# Patient Record
Sex: FEMALE | Race: BLACK OR AFRICAN AMERICAN | NOT HISPANIC OR LATINO | URBAN - METROPOLITAN AREA
[De-identification: names, ages, dates, MRNs, and addresses within clinical notes are randomized per-mention and may not be internally consistent; named-entity substitution may affect disease eponyms.]

---

## 2018-10-31 ENCOUNTER — EMERGENCY (EMERGENCY)
Facility: HOSPITAL | Age: 28
LOS: 1 days | Discharge: HOME | End: 2018-10-31
Admitting: NEUROLOGICAL SURGERY
Payer: MEDICAID

## 2018-10-31 VITALS
DIASTOLIC BLOOD PRESSURE: 73 MMHG | RESPIRATION RATE: 16 BRPM | HEART RATE: 96 BPM | TEMPERATURE: 99 F | OXYGEN SATURATION: 100 % | SYSTOLIC BLOOD PRESSURE: 138 MMHG

## 2018-10-31 PROCEDURE — 99284 EMERGENCY DEPT VISIT MOD MDM: CPT

## 2018-10-31 RX ORDER — ACETAMINOPHEN 500 MG
975 TABLET ORAL ONCE
Qty: 0 | Refills: 0 | Status: COMPLETED | OUTPATIENT
Start: 2018-10-31 | End: 2018-10-31

## 2018-10-31 RX ORDER — SODIUM CHLORIDE 9 MG/ML
1000 INJECTION INTRAMUSCULAR; INTRAVENOUS; SUBCUTANEOUS ONCE
Qty: 0 | Refills: 0 | Status: COMPLETED | OUTPATIENT
Start: 2018-10-31 | End: 2018-10-31

## 2018-10-31 RX ORDER — METOCLOPRAMIDE HCL 10 MG
10 TABLET ORAL ONCE
Qty: 0 | Refills: 0 | Status: COMPLETED | OUTPATIENT
Start: 2018-10-31 | End: 2018-10-31

## 2018-10-31 RX ADMIN — Medication 10 MILLIGRAM(S): at 23:37

## 2018-10-31 RX ADMIN — Medication 975 MILLIGRAM(S): at 23:36

## 2018-10-31 RX ADMIN — SODIUM CHLORIDE 1000 MILLILITER(S): 9 INJECTION INTRAMUSCULAR; INTRAVENOUS; SUBCUTANEOUS at 23:37

## 2018-10-31 NOTE — ED PROVIDER NOTE - MEDICAL DECISION MAKING DETAILS
29 yo F, active smoker with no significant PMH of  who presents to the ER c/o persistent headache s/p head injury for duration of 3 days w/ swelling. Well appearing female, s/p head injury of unknown etiology, p/w acute persistent headache, no LOC, not on blood thinner, no n/v, no blurry vision, no dizziness, no focal neuro deficits, ambulate with steady gait. GCS of 15. no signs of ICH on exam. likely head contusion, concussion. No warrant for imaging at this time. Discussed risks and benefits of CT head. Pt does not want to wait for CT, agrees to plan.

## 2018-10-31 NOTE — ED PROVIDER NOTE - PROGRESS NOTE DETAILS
PAULETTE HICKEY: Patient reassessed, sitting comfortably in chair in NAD, denies any complaints. States feeling better, symptoms improved. Pending lab results. Pt will be signed out to PAULETTE Dietz to f/u with lab results, if negative, pt can be discharged to f/u with PCP. Return precaution given to patient. Pt verbalizes understanding. PAULETTE Dietz: Received signout from PAULETTE Dooley, pt feeling better, states she no longer has HA. No Nuchal rigidity, pt well appearing, slight wbc count likely from pain, plan is likely D/C with PMD and neurology F/U suspect cause to be a concussion. PAULETTE Dietz: Received signout from PAULETTE Dooley, pt feeling better, states she no longer has HA. No Nuchal rigidity, pt well appearing, slight wbc count likely from pain, plan is likely D/C with PMD and neurology F/U suspect cause to be a concussion. Pt was offered a CT scan by PAULETTE Dooley but declined, pt states she would like to leave as soon as possible.

## 2018-10-31 NOTE — ED ADULT TRIAGE NOTE - CHIEF COMPLAINT QUOTE
Pt. w/ no pmh c/o left sided head pain. Pt. states on Monday while play fighting hurt the top left side of her head with an unknown object. pt. denies any loss of consciousness , Pt. denies any blurry vision , any N/V. Pt. c/o pain 7/10 , appears comfortable in triage. Pt. has a small bump on top left side of the head. Pt. states pressure increases when she bends down to tie her shoes.

## 2018-10-31 NOTE — ED PROVIDER NOTE - CARE PLAN
Principal Discharge DX:	Injury of head, initial encounter  Secondary Diagnosis:	Contusion of head, unspecified part of head, initial encounter  Secondary Diagnosis:	Headache Principal Discharge DX:	Injury of head, initial encounter  Assessment and plan of treatment:	Medication for your headaches have been sent to your pharmacy. Follow up with your primary doctor and a neurologist, referral is included in your discharge packet. Advance activity as tolerated.  Continue all previously prescribed medications as directed.  Follow up with your primary care physician in 48-72 hours- bring copies of your results.  Return to the ER for worsening or persistent symptoms, and/or ANY NEW OR CONCERNING SYMPTOMS. If you have issues obtaining follow up, please call: [phone] to obtain a doctor or specialist who takes your insurance in your area.  You may call [phone] to make an appointment with the internal medicine clinic.  Secondary Diagnosis:	Contusion of head, unspecified part of head, initial encounter  Secondary Diagnosis:	Headache

## 2018-10-31 NOTE — ED PROVIDER NOTE - OBJECTIVE STATEMENT
29 yo F, active smoker with no significant PMH of  who presents to the ER c/o persistent headache s/p head injury for duration of 3 days w/ swelling. Pt states onset is sudden after head injury of unknown etiology, no actual witness of what happened, her friends happened to turn away, states she's standing near staircase and side of the house. Pt states she was outside the house, play fighting with her friends, and suddenly hit her head somehow, felt dizzy, like she's about to pass out, but no LOC. Reports pain at left frontal area, character of pain is pressure, aching, constant,  9/10 in severity, non-radiating. Admits taking Tylenol and ice compress without improvement. Also, reports she feeling more tired after the head injury. Denies any fever, chills, n/v/d/c, dizziness, blurry vision, weakness, numbness, hearing loss, nasal drainage, neck pain, chest pain, sob, abdominal pain, dysuria, leg swelling, recent travel or any other complaints.

## 2018-11-01 LAB
ALBUMIN SERPL ELPH-MCNC: 4.1 G/DL — SIGNIFICANT CHANGE UP (ref 3.3–5)
ALP SERPL-CCNC: 64 U/L — SIGNIFICANT CHANGE UP (ref 40–120)
ALT FLD-CCNC: 17 U/L — SIGNIFICANT CHANGE UP (ref 4–33)
AST SERPL-CCNC: 19 U/L — SIGNIFICANT CHANGE UP (ref 4–32)
BASOPHILS # BLD AUTO: 0.03 K/UL — SIGNIFICANT CHANGE UP (ref 0–0.2)
BASOPHILS NFR BLD AUTO: 0.2 % — SIGNIFICANT CHANGE UP (ref 0–2)
BILIRUB SERPL-MCNC: < 0.2 MG/DL — LOW (ref 0.2–1.2)
BUN SERPL-MCNC: 12 MG/DL — SIGNIFICANT CHANGE UP (ref 7–23)
CALCIUM SERPL-MCNC: 9.2 MG/DL — SIGNIFICANT CHANGE UP (ref 8.4–10.5)
CHLORIDE SERPL-SCNC: 104 MMOL/L — SIGNIFICANT CHANGE UP (ref 98–107)
CO2 SERPL-SCNC: 25 MMOL/L — SIGNIFICANT CHANGE UP (ref 22–31)
CREAT SERPL-MCNC: 0.8 MG/DL — SIGNIFICANT CHANGE UP (ref 0.5–1.3)
EOSINOPHIL # BLD AUTO: 0.08 K/UL — SIGNIFICANT CHANGE UP (ref 0–0.5)
EOSINOPHIL NFR BLD AUTO: 0.6 % — SIGNIFICANT CHANGE UP (ref 0–6)
GLUCOSE SERPL-MCNC: 94 MG/DL — SIGNIFICANT CHANGE UP (ref 70–99)
HCT VFR BLD CALC: 40.5 % — SIGNIFICANT CHANGE UP (ref 34.5–45)
HGB BLD-MCNC: 12.6 G/DL — SIGNIFICANT CHANGE UP (ref 11.5–15.5)
IMM GRANULOCYTES # BLD AUTO: 0.03 # — SIGNIFICANT CHANGE UP
IMM GRANULOCYTES NFR BLD AUTO: 0.2 % — SIGNIFICANT CHANGE UP (ref 0–1.5)
LYMPHOCYTES # BLD AUTO: 25.2 % — SIGNIFICANT CHANGE UP (ref 13–44)
LYMPHOCYTES # BLD AUTO: 3.11 K/UL — SIGNIFICANT CHANGE UP (ref 1–3.3)
MCHC RBC-ENTMCNC: 27.8 PG — SIGNIFICANT CHANGE UP (ref 27–34)
MCHC RBC-ENTMCNC: 31.1 % — LOW (ref 32–36)
MCV RBC AUTO: 89.4 FL — SIGNIFICANT CHANGE UP (ref 80–100)
MONOCYTES # BLD AUTO: 0.8 K/UL — SIGNIFICANT CHANGE UP (ref 0–0.9)
MONOCYTES NFR BLD AUTO: 6.5 % — SIGNIFICANT CHANGE UP (ref 2–14)
NEUTROPHILS # BLD AUTO: 8.31 K/UL — HIGH (ref 1.8–7.4)
NEUTROPHILS NFR BLD AUTO: 67.3 % — SIGNIFICANT CHANGE UP (ref 43–77)
NRBC # FLD: 0 — SIGNIFICANT CHANGE UP
PLATELET # BLD AUTO: 218 K/UL — SIGNIFICANT CHANGE UP (ref 150–400)
PMV BLD: 10.2 FL — SIGNIFICANT CHANGE UP (ref 7–13)
POTASSIUM SERPL-MCNC: 4.4 MMOL/L — SIGNIFICANT CHANGE UP (ref 3.5–5.3)
POTASSIUM SERPL-SCNC: 4.4 MMOL/L — SIGNIFICANT CHANGE UP (ref 3.5–5.3)
PROT SERPL-MCNC: 7.3 G/DL — SIGNIFICANT CHANGE UP (ref 6–8.3)
RBC # BLD: 4.53 M/UL — SIGNIFICANT CHANGE UP (ref 3.8–5.2)
RBC # FLD: 12.8 % — SIGNIFICANT CHANGE UP (ref 10.3–14.5)
SODIUM SERPL-SCNC: 140 MMOL/L — SIGNIFICANT CHANGE UP (ref 135–145)
WBC # BLD: 12.36 K/UL — HIGH (ref 3.8–10.5)
WBC # FLD AUTO: 12.36 K/UL — HIGH (ref 3.8–10.5)

## 2018-11-01 RX ORDER — METOCLOPRAMIDE HCL 10 MG
1 TABLET ORAL
Qty: 20 | Refills: 0 | OUTPATIENT
Start: 2018-11-01 | End: 2018-11-05

## 2021-07-15 PROBLEM — Z00.00 ENCOUNTER FOR PREVENTIVE HEALTH EXAMINATION: Status: ACTIVE | Noted: 2021-07-15

## 2021-07-19 ENCOUNTER — APPOINTMENT (OUTPATIENT)
Dept: ORTHOPEDIC SURGERY | Facility: CLINIC | Age: 31
End: 2021-07-19
Payer: OTHER MISCELLANEOUS

## 2021-07-19 VITALS — HEART RATE: 72 BPM | DIASTOLIC BLOOD PRESSURE: 71 MMHG | SYSTOLIC BLOOD PRESSURE: 116 MMHG

## 2021-07-19 DIAGNOSIS — F17.210 NICOTINE DEPENDENCE, CIGARETTES, UNCOMPLICATED: ICD-10-CM

## 2021-07-19 PROCEDURE — 73110 X-RAY EXAM OF WRIST: CPT | Mod: LT

## 2021-07-19 PROCEDURE — 99072 ADDL SUPL MATRL&STAF TM PHE: CPT

## 2021-07-19 PROCEDURE — 99204 OFFICE O/P NEW MOD 45 MIN: CPT

## 2021-07-19 RX ORDER — NAPROXEN 500 MG/1
500 TABLET ORAL
Qty: 14 | Refills: 0 | Status: ACTIVE | COMMUNITY
Start: 2021-06-17

## 2021-07-20 PROBLEM — F17.210 CIGARETTE SMOKER: Status: ACTIVE | Noted: 2021-07-20

## 2021-08-25 ENCOUNTER — APPOINTMENT (OUTPATIENT)
Dept: ORTHOPEDIC SURGERY | Facility: CLINIC | Age: 31
End: 2021-08-25
Payer: OTHER MISCELLANEOUS

## 2021-08-25 PROCEDURE — 99072 ADDL SUPL MATRL&STAF TM PHE: CPT

## 2021-08-25 PROCEDURE — 99214 OFFICE O/P EST MOD 30 MIN: CPT

## 2021-10-25 ENCOUNTER — APPOINTMENT (OUTPATIENT)
Dept: ORTHOPEDIC SURGERY | Facility: CLINIC | Age: 31
End: 2021-10-25
Payer: OTHER MISCELLANEOUS

## 2021-10-25 VITALS
HEIGHT: 67 IN | BODY MASS INDEX: 22.91 KG/M2 | HEART RATE: 80 BPM | DIASTOLIC BLOOD PRESSURE: 75 MMHG | SYSTOLIC BLOOD PRESSURE: 109 MMHG | WEIGHT: 146 LBS

## 2021-10-25 PROCEDURE — 99214 OFFICE O/P EST MOD 30 MIN: CPT

## 2021-10-25 PROCEDURE — 99072 ADDL SUPL MATRL&STAF TM PHE: CPT

## 2021-10-25 RX ORDER — CLINDAMYCIN PHOSPHATE 20 MG/G
2 CREAM VAGINAL
Qty: 40 | Refills: 0 | Status: DISCONTINUED | COMMUNITY
Start: 2021-05-06 | End: 2021-10-25

## 2021-10-25 RX ORDER — METRONIDAZOLE 500 MG/1
500 TABLET ORAL
Qty: 14 | Refills: 0 | Status: DISCONTINUED | COMMUNITY
Start: 2021-07-01 | End: 2021-10-25

## 2021-10-25 RX ORDER — FLUCONAZOLE 150 MG/1
150 TABLET ORAL
Qty: 1 | Refills: 0 | Status: DISCONTINUED | COMMUNITY
Start: 2021-04-27 | End: 2021-10-25

## 2021-10-25 RX ORDER — CLOTRIMAZOLE AND BETAMETHASONE DIPROPIONATE 10; .5 MG/G; MG/G
1-0.05 CREAM TOPICAL
Qty: 45 | Refills: 0 | Status: DISCONTINUED | COMMUNITY
Start: 2021-03-09 | End: 2021-10-25

## 2021-10-25 NOTE — HISTORY OF PRESENT ILLNESS
[No] : The patient is currently not working. [FreeTextEntry1] : 32y/o RHD female presents for follow up of pain in the ulnar aspect of the left wrist since work injury on 6/16/21. Patient has been improving. \par The patient continues have left wrist pain. MRI has confirmed tear of triangular fibrocartilage complex, TFCC, left wrist. Patient was having mild improvement in pain was too excessive to allow the patient to be able to work, when the patient was last seen in August 25, 2021.\par Today, the patient states that she is better. Pt is not ready to do back to work, because she doesn't want to re-injure the wrist.\par \par The patient works for [employer]. 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 While she tried to stop him from injuring himself by holding him with both arms to restrain him, in some way, her left wrist was injured. \par Patient stated she immediately felt sharp pain in the ulnar aspect of the left wrist. \par Patient has been wearing a wrist splint intermittently, but most of the time, which helps. She denies currently taking any  medication for the pain. \par She denies any numbness tingling or triggering. \par \par Patient denies pain prior to the injury in the left wrist.\par \par Hx: MRI left wrist on 7/06/21. \par Submitted MRI report describes "there is T2 hyperintense signal within the central TFCC and the ulnar styloid insertion is torn."\par "There is slight dorsal subluxation of the distal ulna." \par Otherwise are no notable positive findings in the report of right wrist MRI from [facility], interpreted by George Varner MD\par \par This is a WC injury.\par  [FreeTextEntry2] :  [FreeTextEntry4] : MRI left wrist; wrist splint [FreeTextEntry5] : n/a [Has the patient missed work because of the injury/illness?] : The patient has not missed work because of the injury/illness. [FreeTextEntry6] : June 16, 2021

## 2021-10-25 NOTE — ASSESSMENT
[Indicate if, in your opinion, the incident that the patient described was the competent medical cause of this injury/illness.] : The incident that the patient described was the competent medical cause of this injury/illness: Yes [Indicate if the patient's complaints are consistent with his/her history of the injury/illness.] : Indicate if the patient's complaints are consistent with his/her history of the injury/illness: Yes [Yes] : Yes, it is consistent [FreeTextEntry1] : Diagnosis: TFCC tear left wrist\par Left wrist pain\par \par The patient continues have left wrist pain.  MRI has confirmed tear of triangular fibrocartilage complex, TFCC, left wrist.  Patient reports ongoing improvement in pain but still not enough improvement to allow the patient to be able to work.\par Patient is concerned about reinjury.\par Patient feels that she will be able to return to work November 15, 2021.\par This is a reasonable plan.\par There is a risk of reinjury because of the nature of the problem underlying.\par Because the patient is making steady though very slow improvement, cortisone injection is not indicated at this time.\par Patient will return for reassessment November 15, 2021.  At that time decision will be made whether or not the patient is ready to return to work.\par \par At this time the patient is unable to return to work because of the pain weakness and disability.\par Duration of disability and inability to work at least November 15, 2021.\par  [Can the patient return to usual work activities as indicated? If yes, indicate date___] : The patient cannot return to usual work activities as indicated. [Are there any work limitations? (If so, explain and quantify, including the anticipated duration of the limitations)] : There are work limitations. [FreeTextEntry2] : yes [FreeTextEntry3] : Yes.   with one-on-one supervision of individuals. [FreeTextEntry6] : 100% left wrist [FreeTextEntry7] : Unable to work at this time. [Physical Disability Temporary Total] : temporarily total disabled

## 2021-10-25 NOTE — RETURN TO WORK/SCHOOL
[FreeTextEntry1] : \par Diagnosis: TFCC tear left wrist\par Left wrist injury.\par \par The patient has ongoing symptoms in the left wrist.  Patient is not ready to return to work.\par The patient will be reevaluated November 15, 2021.  At that time a determination will be made whether the patient can return to work and then what capacity.  Most likely the patient will be able to return to work but would need to avoid lifting twisting holding with the left wrist in excess of 10 pounds.\par Final determination to be made on November 15, 2021, when the patient will be seen for evaluation and potentially for clearance.\par \par Ajit Dickerson M.D.\par , Chief Hand Surgery\par Department of Orthopaedic Surgery\par [facility]\par Professor of Orthopaedic Surgery\par Immediate Past President, Faculty [employer]\par Mario FLOYD at hospitals/[facility]\par [id number]

## 2021-10-25 NOTE — PHYSICAL EXAM
[de-identified] : Left Wrist\par No obvious swelling.\par Consistent tenderness over TFCC dorsally, ulnarly, volarly\par DRUJ dorsal volar stress test: Slightly more laxity in neutral, as expected,\par Very slight increase dorsal volar laxity compared to right wrist\par No clicks or clunks with dorsal volar stress testing\par Mild pain in supination with dorsal volar stress test.\par Minimal pain in neutral with dorsal volar stress test DRUJ\par Wrist extension/flexion 50 degrees / 50 degrees without pain\par Supination/pronation actively 75/75 degrees without pain\par ECU nontender\par Pisiform, FCU, negative findings\par Remaining extensor compartments no notable positive finding\par No other notable positive wrist findings\par Basal joint laxity 3-4+ no pain or crepitus\par No A1 pulley tenderness and no triggering in any finger.\par No pertinent MP, PIP, or DIP joint contributory findings.\par \par Right wrist\par Extension/flexion 60 degrees / 60 degrees without pain\par TFCC nontender dorsally, ulnarly, volarly.\par DRUJ dorsal volar stress test no pain, no clicks, no clunks\par ECU, FCU, pisiform negative findings\par No tenderness manipulation or palpation right wrist joint\par No pertinent CMC, MP, PIP, or DIP joint contributory finding.\par No A1 pulley tenderness and no triggering in any finger.\par \par Neurologic: Median, ulnar, and radial motor and sensory are intact. \par Skin: No cyanosis, clubbing, or rashes.\par Vascular: Radial pulses intact.\par Lymphatic: No streaking or epitrochlear adenopathy.\par The patient is awake, alert, and oriented. Affect appropriate. Cooperative.

## 2021-11-15 ENCOUNTER — APPOINTMENT (OUTPATIENT)
Dept: ORTHOPEDIC SURGERY | Facility: CLINIC | Age: 31
End: 2021-11-15
Payer: OTHER MISCELLANEOUS

## 2021-11-15 VITALS — HEIGHT: 67 IN | WEIGHT: 146 LBS | BODY MASS INDEX: 22.91 KG/M2

## 2021-11-15 PROCEDURE — 99072 ADDL SUPL MATRL&STAF TM PHE: CPT

## 2021-11-15 PROCEDURE — 99214 OFFICE O/P EST MOD 30 MIN: CPT

## 2021-11-15 RX ORDER — DOXYCYCLINE HYCLATE 100 MG/1
100 CAPSULE ORAL
Qty: 14 | Refills: 0 | Status: ACTIVE | COMMUNITY
Start: 2021-10-05

## 2021-11-15 RX ORDER — FLUCONAZOLE 100 MG/1
100 TABLET ORAL
Qty: 3 | Refills: 0 | Status: ACTIVE | COMMUNITY
Start: 2021-11-02

## 2021-11-15 RX ORDER — VALACYCLOVIR 500 MG/1
500 TABLET, FILM COATED ORAL
Qty: 30 | Refills: 0 | Status: ACTIVE | COMMUNITY
Start: 2021-07-26

## 2021-11-15 RX ORDER — AZITHROMYCIN 250 MG/1
250 TABLET, FILM COATED ORAL
Qty: 6 | Refills: 0 | Status: ACTIVE | COMMUNITY
Start: 2021-10-05

## 2021-11-15 NOTE — HISTORY OF PRESENT ILLNESS
[FreeTextEntry1] : 30y/o RHD female presents for follow up of pain in the ulnar aspect of the left wrist since work injury on 6/16/21. Patient has been improving.  Patient was last seen October 25, 2021 and was improving.  Patient however felt that she was not able to return to work. Patient is being treated for left wrist pain with MRI confirmed tear of TFC tear left wrist, work-related.\par Patient presents today for reassessment.  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  The patient states that she was advised by her employer not return unless she can work full capacity.\par At this point the patient is concerned about potential reinjury and feels that she is not confident that she can work at full capacity.  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  In addition patient is concerned about potential reinjury.\par No additional new history or complaints.\par \par \par Past history: \par The patient works for [employer]. 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 While she tried to stop him from injuring himself by holding him with both arms to restrain him, in some way, her left wrist was injured. \par Patient stated she immediately felt sharp pain in the ulnar aspect of the left wrist. \par The patient is not currently working because of the wrist injury and associated pain. \par She reports minimum improvement in pain. Patient states lifting, holding objects worsen the pain. \par Patient has been wearing a wrist splint intermittently, but most of the time, which helps. She denies currently taking any  medication for the pain. \par She denies any numbness tingling or triggering. \par \par Patient denies pain prior to the injury in the left wrist.\par \par MRI left wrist on 7/06/21. \par Submitted MRI report describes "there is T2 hyperintense signal within the central TFCC and the ulnar styloid insertion is torn."\par "There is slight dorsal subluxation of the distal ulna." \par Otherwise are no notable positive findings in the report of right wrist MRI from [facility], interpreted by George Varner MD\par \par This is a WC injury.\par  [Has the patient missed work because of the injury/illness?] : The patient has missed work because of the injury/illness. [No] : The patient is currently not working. [FreeTextEntry6] : June 16, 2021

## 2021-11-15 NOTE — PHYSICAL EXAM
[de-identified] : Left Wrist\par No obvious swelling.\par Mild/minimal tenderness over TFCC dorsally, ulnarly, volarly\par DRUJ dorsal volar stress test: Slightly more laxity in neutral, as expected,\par Dorsal volar wrist laxity is comparable to the right wrist\par No clicks or clunks with dorsal volar stress testing\par Min pain in supination with dorsal volar stress test.\par Minimal pain in neutral with dorsal volar stress test DRUJ\par Wrist extension/flexion 80 degrees / 60 degrees without pain\par Supination/pronation actively 90/75 degrees without pain\par ECU nontender\par Pisiform, FCU, negative findings\par Remaining extensor compartments no notable positive finding\par No other notable positive wrist findings\par Basal joint laxity 3-4+ no pain or crepitus\par No A1 pulley tenderness and no triggering in any finger.\par No pertinent MP, PIP, or DIP joint contributory findings.\par \par Right wrist\par Extension/flexion 90 degrees / 80 degrees without pain\par Pronation/supination: 75 degrees / 95 degrees\par TFCC nontender dorsally, ulnarly, volarly.\par DRUJ dorsal volar stress test no pain, no clicks, no clunks\par ECU, FCU, pisiform negative findings\par No tenderness manipulation or palpation right wrist joint\par No pertinent CMC, MP, PIP, or DIP joint contributory finding.\par No A1 pulley tenderness and no triggering in any finger.\par \par Neurologic: Median, ulnar, and radial motor and sensory are intact. \par Skin: No cyanosis, clubbing, or rashes.\par Vascular: Radial pulses intact.\par Lymphatic: No streaking or epitrochlear adenopathy.\par The patient is awake, alert, and oriented. Affect appropriate. Cooperative.

## 2021-11-15 NOTE — ASSESSMENT
[Indicate if, in your opinion, the incident that the patient described was the competent medical cause of this injury/illness.] : The incident that the patient described was the competent medical cause of this injury/illness: Yes [Indicate if the patient's complaints are consistent with his/her history of the injury/illness.] : Indicate if the patient's complaints are consistent with his/her history of the injury/illness: Yes [Yes] : Yes, it is consistent [Can the patient return to usual work activities as indicated? If yes, indicate date___] : The patient can return to usual work activities on [unfilled] [Are there any work limitations? (If so, explain and quantify, including the anticipated duration of the limitations)] : There are work limitations. [Physical Disability Permanent Partial] : permanently partial disabled [FreeTextEntry1] : Patient was injured at work.  Direct causal relationship between the patient's work work and the patient's left wrist pain and TFC tear. [FreeTextEntry5] : 100 [FreeTextEntry6] : Patient not cleared to return to work at full capacity [FreeTextEntry7] : not cleared to return to work

## 2021-11-30 ENCOUNTER — NON-APPOINTMENT (OUTPATIENT)
Age: 31
End: 2021-11-30

## 2021-12-14 NOTE — REASON FOR VISIT
[Follow-Up Visit] : a follow-up visit for [Workers' Comp: Date of Injury: _______] : This visit is related to worker's compensation. Date of Injury: [unfilled]

## 2021-12-15 ENCOUNTER — APPOINTMENT (OUTPATIENT)
Dept: ORTHOPEDIC SURGERY | Facility: CLINIC | Age: 31
End: 2021-12-15
Payer: OTHER MISCELLANEOUS

## 2021-12-15 VITALS
HEART RATE: 91 BPM | SYSTOLIC BLOOD PRESSURE: 132 MMHG | DIASTOLIC BLOOD PRESSURE: 87 MMHG | BODY MASS INDEX: 22.91 KG/M2 | WEIGHT: 146 LBS | HEIGHT: 67 IN

## 2021-12-15 DIAGNOSIS — M25.532 PAIN IN LEFT WRIST: ICD-10-CM

## 2021-12-15 DIAGNOSIS — M25.332 OTHER INSTABILITY, LEFT WRIST: ICD-10-CM

## 2021-12-15 DIAGNOSIS — S69.82XD OTHER SPECIFIED INJURIES OF LEFT WRIST, HAND AND FINGER(S), SUBSEQUENT ENCOUNTER: ICD-10-CM

## 2021-12-15 PROCEDURE — 99072 ADDL SUPL MATRL&STAF TM PHE: CPT

## 2021-12-15 PROCEDURE — 99214 OFFICE O/P EST MOD 30 MIN: CPT

## 2021-12-15 NOTE — PHYSICAL EXAM
[de-identified] : Left Wrist\par No obvious swelling.\par No tenderness over TFCC dorsally, ulnarly, volarly\par DRUJ dorsal volar stress test: Slightly more laxity in neutral\par Dorsal volar wrist laxity is comparable to the right wrist\par No clicks or clunks with dorsal volar stress testing\par No pain in supination with dorsal volar stress test.\par No pain in neutral with dorsal volar stress test DRUJ\par Wrist extension/flexion 80 degrees / 60 degrees without pain\par Supination/pronation actively 90/75 degrees without pain\par ECU nontender\par Pisiform, FCU, negative findings\par Remaining extensor compartments no notable positive finding\par No other notable positive wrist findings\par Basal joint laxity 3-4+ no pain or crepitus\par No A1 pulley tenderness and no triggering in any finger.\par No pertinent MP, PIP, or DIP joint contributory findings.\par \par Right wrist\par Extension/flexion 90 degrees / 80 degrees without pain\par Pronation/supination: 75 degrees / 95 degrees\par TFCC nontender dorsally, ulnarly, volarly.\par DRUJ dorsal volar stress test no pain, no clicks, no clunks\par ECU, FCU, pisiform negative findings\par No tenderness manipulation or palpation right wrist joint\par No pertinent CMC, MP, PIP, or DIP joint contributory finding.\par No A1 pulley tenderness and no triggering in any finger.\par \par Neurologic: Median, ulnar, and radial motor and sensory are intact. \par Skin: No cyanosis, clubbing, or rashes.\par Vascular: Radial pulses intact.\par Lymphatic: No streaking or epitrochlear adenopathy.\par The patient is awake, alert, and oriented. Affect appropriate. Cooperative.

## 2021-12-15 NOTE — RETURN TO WORK/SCHOOL
[FreeTextEntry1] : \par Diagnosis: Left wrist work injury\par TFCC tear left wrist\par Date of injury June 16, 2021\par \par The patient was disabled from work continuously from date of injury June 16, 2021.  The patient has improved.  The patient is now cleared to return to work full duty and full capacity without restriction as of December 29, 2021.\par \par Ajit Dickerson M.D.\par , Chief Hand Surgery\par Department of Orthopaedic Surgery\par [facility]\par Professor of Orthopaedic Surgery\par Immediate Past President, Faculty [employer]\par Mario FLOYD at [facility]

## 2021-12-15 NOTE — HISTORY OF PRESENT ILLNESS
[Has the patient missed work because of the injury/illness?] : The patient has missed work because of the injury/illness. [No] : The patient is currently not working. [FreeTextEntry1] : Left wrist pain (719.43) (M25.532)\par TFCC (triangular fibrocartilage complex) injury, left, subsequent encounter (V58.89)\par (S69.82XD)\par Pain in left wrist (719.43) (M25.532)\par Instability of left wrist joint (718.83) (M25.332)\par \par 30y/o RHD female presents for follow up of pain in the ulnar aspect of the left wrist since work injury on 6/16/21. Patient has been improving. Patient was last seen 11/15/21.\par The patient states that she is largely doing well but remains concerned that with heavy work, such as restraining a patient or something comparable she may sustain reinjury.\par Patient provides additional history that she communicated with her employer. The patient states that she was advised by her employer not return unless she can work full capacity.\par At this point the patient is concerned about potential reinjury, but today believes that she will be able to return to work at full capacity as of December 29, 2021. [FreeTextEntry6] :  6/16/21

## 2021-12-15 NOTE — ASSESSMENT
[Indicate if, in your opinion, the incident that the patient described was the competent medical cause of this injury/illness.] : The incident that the patient described was the competent medical cause of this injury/illness: Yes [Indicate if the patient's complaints are consistent with his/her history of the injury/illness.] : Indicate if the patient's complaints are consistent with his/her history of the injury/illness: Yes [Yes] : Yes, it is consistent [Can the patient return to usual work activities as indicated? If yes, indicate date___] : The patient can return to usual work activities on [unfilled] [Are there any work limitations? (If so, explain and quantify, including the anticipated duration of the limitations)] : There are no work limitations.  [FreeTextEntry1] : TFCC tear left wrist\par Left wrist pain [FreeTextEntry6] : Patient is cleared to return to work full duty at full capacity as of December 29, 2021.

## 2022-09-27 ENCOUNTER — NON-APPOINTMENT (OUTPATIENT)
Age: 32
End: 2022-09-27

## 2023-02-28 ENCOUNTER — EMERGENCY (EMERGENCY)
Facility: HOSPITAL | Age: 33
LOS: 1 days | Discharge: HOME | End: 2023-02-28
Attending: EMERGENCY MEDICINE | Admitting: EMERGENCY MEDICINE
Payer: COMMERCIAL

## 2023-02-28 VITALS
RESPIRATION RATE: 17 BRPM | DIASTOLIC BLOOD PRESSURE: 77 MMHG | OXYGEN SATURATION: 100 % | HEART RATE: 97 BPM | TEMPERATURE: 98 F | SYSTOLIC BLOOD PRESSURE: 133 MMHG

## 2023-02-28 PROCEDURE — 99283 EMERGENCY DEPT VISIT LOW MDM: CPT

## 2023-02-28 RX ORDER — IBUPROFEN 200 MG
400 TABLET ORAL ONCE
Refills: 0 | Status: COMPLETED | OUTPATIENT
Start: 2023-02-28 | End: 2023-02-28

## 2023-02-28 RX ORDER — CYCLOBENZAPRINE HYDROCHLORIDE 10 MG/1
1 TABLET, FILM COATED ORAL
Qty: 15 | Refills: 0
Start: 2023-02-28 | End: 2023-03-04

## 2023-02-28 RX ADMIN — Medication 400 MILLIGRAM(S): at 12:31
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
## 2023-02-28 NOTE — ED ADULT TRIAGE NOTE - CHIEF COMPLAINT QUOTE
Pt c/o right sided jaw pain x1 week, Pt unable to eat solid food due to pain. Denies recent trauma/ injury

## 2023-02-28 NOTE — ED PROVIDER NOTE - NSFOLLOWUPINSTRUCTIONS_ED_ALL_ED_FT
Please follow up with your primary care doctor and dentist.     Temporomandibular Disorder    WHAT YOU NEED TO KNOW:    Temporomandibular disorder is a condition that causes pain in your jaw. The disorder affects the joint between your temporal bone and your mandible (jawbone). The muscles and nerves around the joint are also affected.     DISCHARGE INSTRUCTIONS:    Medicines:   •Pain medicine: You may be given a prescription medicine to decrease pain. Do not wait until the pain is severe before you take this medicine.  •NSAIDs: These medicines decrease swelling and pain. You can buy NSAIDs without a doctor's order. Ask your healthcare provider which medicine is right for you, and how much to take. Take as directed. NSAIDs can cause stomach bleeding or kidney problems if not taken correctly.  •Muscle relaxers help decrease pain and muscle spasms.  •Take your medicine as directed. Contact your healthcare provider if you think your medicine is not helping or if you have side effects. Tell him of her if you are allergic to any medicine. Keep a list of the medicines, vitamins, and herbs you take. Include the amounts, and when and why you take them. Bring the list or the pill bottles to follow-up visits. Carry your medicine list with you in case of an emergency.    Follow up with your doctor as directed: Write down your questions so you remember to ask them during your visits.     Manage your symptoms:   •Eat soft foods: Your healthcare provider may suggest that you eat only soft foods for several days. A dietitian may work with you to find foods that are easier to bite, chew, or swallow. Examples are soup, applesauce, cottage cheese, pudding, yogurt, and soft fruits.     •Use jaw supporting devices: Splints may be used to support your jaw or keep your jaw from moving. You may need to wear a mouth guard to keep you from clenching or grinding your teeth while you are sleeping.    •Use ice and heat: Ice helps decrease swelling and pain. Ice may also help prevent tissue damage. Use an ice pack, or put crushed ice in a plastic bag. Cover it with a towel and place it on your jaw for 15 to 20 minutes every hour or as directed. After the first 24 to 48 hours, use heat to decrease pain, swelling, and muscle spasms. Apply heat on the area for 20 to 30 minutes every 2 hours for as many days as directed. Use a heating pad, moist warm compress, or a hot water bottle.     •Go to physical therapy: A physical therapist teaches you exercises to help improve movement and strength, and to decrease pain in your jaw. A speech therapist may help you with swallowing and speech exercises.    Contact your healthcare provider if:   •You have a fever.  •Your splint or mouth guard is loose.  •You have questions or concerns about your condition or care.    Return to the emergency department if:   •You have nausea, are vomiting, or cannot keep liquids down.  •You have pain that does not go away even after you take your pain medicine.  •You have problems breathing, talking, drinking, eating, or swallowing.  •Your splint or mouth guard gets damaged or broken. Please follow up with your primary care doctor and dentist.     please use a mouth guard     -- Please use 1000mg Tylenol (also called acetaminophen) every 4 hours & 600mg Motrin (also called Advil or ibuprofen) every 6 hours as needed for pain/discomfort/swelling. You can get these without a prescription. Don't use more than 3500mg of Tylenol in any 24-hour period. Make sure your other prescription/over-the-counter medications don't contain any Tylenol so you don't take too much. If you have any stomach discomfort while taking Motrin, you can use TUMS or Pepcid or Zantac (these can all be bought without a prescription).      Temporomandibular Disorder    WHAT YOU NEED TO KNOW:    Temporomandibular disorder is a condition that causes pain in your jaw. The disorder affects the joint between your temporal bone and your mandible (jawbone). The muscles and nerves around the joint are also affected.     DISCHARGE INSTRUCTIONS:    Medicines:   •Pain medicine: You may be given a prescription medicine to decrease pain. Do not wait until the pain is severe before you take this medicine.  •NSAIDs: These medicines decrease swelling and pain. You can buy NSAIDs without a doctor's order. Ask your healthcare provider which medicine is right for you, and how much to take. Take as directed. NSAIDs can cause stomach bleeding or kidney problems if not taken correctly.  •Muscle relaxers help decrease pain and muscle spasms.  •Take your medicine as directed. Contact your healthcare provider if you think your medicine is not helping or if you have side effects. Tell him of her if you are allergic to any medicine. Keep a list of the medicines, vitamins, and herbs you take. Include the amounts, and when and why you take them. Bring the list or the pill bottles to follow-up visits. Carry your medicine list with you in case of an emergency.    Follow up with your doctor as directed: Write down your questions so you remember to ask them during your visits.     Manage your symptoms:   •Eat soft foods: Your healthcare provider may suggest that you eat only soft foods for several days. A dietitian may work with you to find foods that are easier to bite, chew, or swallow. Examples are soup, applesauce, cottage cheese, pudding, yogurt, and soft fruits.     •Use jaw supporting devices: Splints may be used to support your jaw or keep your jaw from moving. You may need to wear a mouth guard to keep you from clenching or grinding your teeth while you are sleeping.    •Use ice and heat: Ice helps decrease swelling and pain. Ice may also help prevent tissue damage. Use an ice pack, or put crushed ice in a plastic bag. Cover it with a towel and place it on your jaw for 15 to 20 minutes every hour or as directed. After the first 24 to 48 hours, use heat to decrease pain, swelling, and muscle spasms. Apply heat on the area for 20 to 30 minutes every 2 hours for as many days as directed. Use a heating pad, moist warm compress, or a hot water bottle.     •Go to physical therapy: A physical therapist teaches you exercises to help improve movement and strength, and to decrease pain in your jaw. A speech therapist may help you with swallowing and speech exercises.    Contact your healthcare provider if:   •You have a fever.  •Your splint or mouth guard is loose.  •You have questions or concerns about your condition or care.    Return to the emergency department if:   •You have nausea, are vomiting, or cannot keep liquids down.  •You have pain that does not go away even after you take your pain medicine.  •You have problems breathing, talking, drinking, eating, or swallowing.  •Your splint or mouth guard gets damaged or broken.

## 2023-02-28 NOTE — ED PROVIDER NOTE - CLINICAL SUMMARY MEDICAL DECISION MAKING FREE TEXT BOX
32 year old female presenting for eval of a popping sensation and pain in her right jaw with eating x1 week. Afebrile, hemodynamically stable with R TMJ tenderness on exam, no restrictions to ROM of the jaw, no swelling, erythema, or lymphadenopathy, ear canals clear and TMs wnl, good dentition.       Most likely this is TMJ disorder given hx and exam, unlikely to be jaw dislocation or mandibular fx given lack of trauma and intact ROM. no visible abscesses on exam, unlikely to be OM given clear ear canals. possibly wisdom tooth impaction could be contributing to this picture, pt needs f/u with dentistry for further management. pt has a dentist. Will control pain with ibuprofen and recommend soft foof diet, use of mouth guards, and f/u with dentistry. 32 year old female presenting for eval of a popping sensation and pain in her right jaw with eating x1 week. Afebrile, hemodynamically stable with R TMJ tenderness on exam, no restrictions to ROM of the jaw, no swelling, erythema, or lymphadenopathy, ear canals clear and TMs wnl, good dentition.       Most likely this is TMJ disorder given hx and exam, unlikely to be jaw dislocation or mandibular fx given lack of trauma and intact ROM. no visible abscesses on exam, unlikely to be OM given clear ear canals. possibly wisdom tooth impaction could be contributing to this picture, pt needs f/u with dentistry for further management. pt has a dentist. Will control pain with ibuprofen, cyclobenzaprine, and recommend soft food diet, use of mouth guards, and f/u with dentistry.

## 2023-02-28 NOTE — ED PROVIDER NOTE - PHYSICAL EXAMINATION
GENERAL: well appearing in no acute distress, non-toxic appearing  HEAD: normocephalic, atraumatic  HEENT: right TMJ ttp, no erythema, swelling or lymphadenopathy, bite is aligned, ROM of jaw intact, no mandibular or zygomatic arch tenderness, ear canals clear b/l, TMs clear and nonbulging b/l, gums are normal, good dentition, sensation intact, no tonsillar exudates, erythema, or edema.   CARDIAC:  2+ pulses in UE/LE b/l  PULM: nonlabored respirations   GI: abdomen nondistended,   NEURO:  AAOx3  MSK: moves all extremities without issue  SKIN: well-perfused, extremities warm, no visible rashes  PSYCH: appropriate mood and affect stretcher

## 2023-02-28 NOTE — ED ADULT NURSE NOTE - OBJECTIVE STATEMENT
Patient received to intake a&ox4, ambulatory c/o right sided jaw pain that radiates to the temple when eating. Pt reports having a  injury to right side of head with LOC and concussion "about 5 years ago." No pertinent medical hx reported. Pt denies cp, sob, blurry vision, headache, dizziness. Breathing even, unlabored. PERRLA. No neuro deficits present.

## 2023-02-28 NOTE — ED PROVIDER NOTE - OBJECTIVE STATEMENT
32 year old female presenting for eval of a popping sensation and pain in her right jaw with eating x1 week. Denies fevers, chills, pain at rest, swelling, difficulty opening her mouth, trauma, hearing loss, pain in her ears. Pt states she hit her R temple against a handrail 2 years ago and is concerned that any injury she may have sustained then is contributing to her pain today. Pt denies jaw pain prior to this episode. Pt does not have all her wisdom teeth removed.

## 2023-02-28 NOTE — ED PROVIDER NOTE - IV ALTEPLASE ADMIN OUTSIDE HIDDEN
Mammogram is fairly unremarkable.  Radiology did mention breast tissue is dense and if patient is interested further screening can be done with ultrasound. show

## 2023-02-28 NOTE — ED PROVIDER NOTE - NSFOLLOWUPCLINICS_GEN_ALL_ED_FT
[facility] General Internal Medicine  General Internal Medicine  [address] [place] [address]  Phone: [phone]  Fax:

## 2023-02-28 NOTE — ED PROVIDER NOTE - PATIENT PORTAL LINK FT
You can access the FollowMyHealth Patient Portal offered by [facility] by registering at the following website: [email or web address]. By joining [employer]’s FollowMyHealth portal, you will also be able to view your health information using other applications (apps) compatible with our system.

## 2024-08-14 ENCOUNTER — NON-APPOINTMENT (OUTPATIENT)
Age: 34
End: 2024-08-14